# Patient Record
Sex: MALE | Race: WHITE | URBAN - METROPOLITAN AREA
[De-identification: names, ages, dates, MRNs, and addresses within clinical notes are randomized per-mention and may not be internally consistent; named-entity substitution may affect disease eponyms.]

---

## 2019-06-16 ENCOUNTER — ED HISTORICAL/CONVERTED ENCOUNTER (OUTPATIENT)
Dept: OTHER | Age: 59
End: 2019-06-16

## 2019-06-21 ENCOUNTER — ED HISTORICAL/CONVERTED ENCOUNTER (OUTPATIENT)
Dept: OTHER | Age: 59
End: 2019-06-21

## 2023-02-19 ENCOUNTER — APPOINTMENT (OUTPATIENT)
Dept: GENERAL RADIOLOGY | Age: 63
End: 2023-02-19
Attending: EMERGENCY MEDICINE
Payer: COMMERCIAL

## 2023-02-19 ENCOUNTER — HOSPITAL ENCOUNTER (EMERGENCY)
Age: 63
Discharge: HOME | End: 2023-02-19
Attending: EMERGENCY MEDICINE
Payer: COMMERCIAL

## 2023-02-19 VITALS
WEIGHT: 184 LBS | BODY MASS INDEX: 29.57 KG/M2 | HEART RATE: 93 BPM | HEIGHT: 66 IN | OXYGEN SATURATION: 98 % | RESPIRATION RATE: 19 BRPM | SYSTOLIC BLOOD PRESSURE: 141 MMHG | TEMPERATURE: 98.7 F | DIASTOLIC BLOOD PRESSURE: 70 MMHG

## 2023-02-19 DIAGNOSIS — S92.002A CLOSED DISPLACED FRACTURE OF LEFT CALCANEUS, UNSPECIFIED PORTION OF CALCANEUS, INITIAL ENCOUNTER: Primary | ICD-10-CM

## 2023-02-19 PROCEDURE — 99283 EMERGENCY DEPT VISIT LOW MDM: CPT

## 2023-02-19 PROCEDURE — 74011250637 HC RX REV CODE- 250/637

## 2023-02-19 PROCEDURE — 73610 X-RAY EXAM OF ANKLE: CPT

## 2023-02-19 PROCEDURE — 73562 X-RAY EXAM OF KNEE 3: CPT

## 2023-02-19 PROCEDURE — 73630 X-RAY EXAM OF FOOT: CPT

## 2023-02-19 PROCEDURE — 75810000053 HC SPLINT APPLICATION

## 2023-02-19 RX ORDER — IBUPROFEN 600 MG/1
600 TABLET ORAL
Qty: 30 TABLET | Refills: 0 | Status: SHIPPED | OUTPATIENT
Start: 2023-02-19

## 2023-02-19 RX ORDER — ACETAMINOPHEN 325 MG/1
650 TABLET ORAL
Qty: 30 TABLET | Refills: 0 | Status: SHIPPED | OUTPATIENT
Start: 2023-02-19

## 2023-02-19 RX ORDER — ACETAMINOPHEN 500 MG
1000 TABLET ORAL
Status: COMPLETED | OUTPATIENT
Start: 2023-02-19 | End: 2023-02-19

## 2023-02-19 RX ORDER — ROSUVASTATIN CALCIUM 5 MG/1
5 TABLET, COATED ORAL DAILY
COMMUNITY
Start: 2023-02-14

## 2023-02-19 RX ORDER — FINASTERIDE 5 MG/1
5 TABLET, FILM COATED ORAL DAILY
COMMUNITY
Start: 2023-02-14

## 2023-02-19 RX ORDER — HYDROCODONE BITARTRATE AND ACETAMINOPHEN 5; 325 MG/1; MG/1
1 TABLET ORAL
Qty: 10 TABLET | Refills: 0 | Status: SHIPPED | OUTPATIENT
Start: 2023-02-19 | End: 2023-02-22

## 2023-02-19 RX ORDER — IBUPROFEN 600 MG/1
600 TABLET ORAL
Status: COMPLETED | OUTPATIENT
Start: 2023-02-19 | End: 2023-02-19

## 2023-02-19 RX ADMIN — IBUPROFEN 600 MG: 600 TABLET, FILM COATED ORAL at 19:32

## 2023-02-19 RX ADMIN — ACETAMINOPHEN 1000 MG: 500 TABLET ORAL at 19:32

## 2023-02-19 NOTE — ED TRIAGE NOTES
Pt reports fall off 10 ft ladder about 2 hours ago. Bruising, deformity, and swelling noted to left ankle and foot. Pt reports pain to left knee. Pt denies hitting his head, No LOC.  Pt is not on blood thinners

## 2023-02-20 ENCOUNTER — OFFICE VISIT (OUTPATIENT)
Dept: PODIATRY | Age: 63
End: 2023-02-20
Payer: COMMERCIAL

## 2023-02-20 VITALS
HEIGHT: 66 IN | HEART RATE: 88 BPM | BODY MASS INDEX: 29.57 KG/M2 | WEIGHT: 184 LBS | TEMPERATURE: 97.8 F | DIASTOLIC BLOOD PRESSURE: 74 MMHG | SYSTOLIC BLOOD PRESSURE: 135 MMHG

## 2023-02-20 DIAGNOSIS — S92.002A CLOSED NONDISPLACED FRACTURE OF LEFT CALCANEUS, UNSPECIFIED PORTION OF CALCANEUS, INITIAL ENCOUNTER: Primary | ICD-10-CM

## 2023-02-20 DIAGNOSIS — S92.015A CLOSED NONDISPLACED FRACTURE OF BODY OF LEFT CALCANEUS, INITIAL ENCOUNTER: Primary | ICD-10-CM

## 2023-02-20 PROCEDURE — 99203 OFFICE O/P NEW LOW 30 MIN: CPT | Performed by: PODIATRIST

## 2023-02-20 NOTE — ED PROVIDER NOTES
[facility] EMERGENCY DEPARTMENT  EMERGENCY DEPARTMENT HISTORY AND PHYSICAL EXAM      Date: 2/19/2023  Patient Name: Ann-Marie Morales  MRN: [id number]  [place]: 1960  Date of evaluation: 2/19/2023  Provider: Ilene Cleveland NP   Note Started: 8:47 PM 2/19/23    HISTORY OF PRESENT ILLNESS     Chief Complaint   Patient presents with    Ankle Injury    Knee Injury    Fall       History Provided By: Patient, Patient's Wife, and Patient's Daughter    HPI: Ann-Marie Smoker, 57 y.o. male who reports only past medical history is hyperlipidemia, presents to the emergency department accompanied by his wife and daughter with complaints of foot/ankle pain s/p mechanical fall from a ladder just prior to arrival.  Patient reports falling about 10 feet and landed on the bottom of his left foot. Denies head trauma/LOC, neck pain, back pain, right lower extremity pain, hip pain. No pain medications taken prior to arrival.  Upon arrival to the emergency department patient is alert, interacting appropriately, no obvious distress noted. PAST MEDICAL HISTORY   Past Medical History:  No past medical history on file. Past Surgical History:  No past surgical history on file. Family History:  No family history on file. Social History: Allergies:  No Known Allergies    PCP: None    Current Meds:   Discharge Medication List as of 2/19/2023  8:15 PM        CONTINUE these medications which have NOT CHANGED    Details   finasteride (PROSCAR) 5 mg tablet Take 5 mg by mouth daily. , Historical Med      rosuvastatin (CRESTOR) 5 mg tablet Take 5 mg by mouth daily. , Historical Med             REVIEW OF SYSTEMS   Review of Systems   Musculoskeletal:  Negative for back pain, neck pain and neck stiffness. Complaints of left ankle and foot pain, denies any other pain/injuries   Skin:  Negative for wound. Neurological:  Negative for dizziness, weakness, light-headedness, numbness and headaches.         She denies head strike/LOC Positives and Pertinent negatives as per HPI. PHYSICAL EXAM     ED Triage Vitals [02/19/23 1842]   ED Encounter Vitals Group      BP (!) 141/70      Pulse (Heart Rate) 93      Resp Rate 19      Temp 98.7 °F (37.1 °C)      Temp src       O2 Sat (%) 98 %      Weight 184 lb      Height 5' 6\"      Physical Exam  Constitutional:       General: He is not in acute distress. Appearance: Normal appearance. He is normal weight. He is not ill-appearing, toxic-appearing or diaphoretic. HENT:      Head: Normocephalic and atraumatic. Eyes:      Extraocular Movements: Extraocular movements intact. Conjunctiva/sclera: Conjunctivae normal.      Pupils: Pupils are equal, round, and reactive to light. Cardiovascular:      Rate and Rhythm: Normal rate and regular rhythm. Pulses: Normal pulses. Heart sounds: Normal heart sounds. Pulmonary:      Effort: Pulmonary effort is normal.      Breath sounds: Normal breath sounds. Musculoskeletal:         General: Swelling, tenderness, deformity and signs of injury present. Cervical back: Normal range of motion and neck supple. No rigidity or tenderness. Comments: Pain and swelling to left lateral malleolus and calcaneus; neurovascularly intact. No c-spine tenderness, back pain, or vertebral body tenderness on exam.  No other injuries identified. Lymphadenopathy:      Cervical: No cervical adenopathy. Skin:     General: Skin is warm and dry. Capillary Refill: Capillary refill takes less than 2 seconds. Findings: No bruising or erythema. Neurological:      Mental Status: He is alert and oriented to person, place, and time. Psychiatric:         Mood and Affect: Mood normal.         Behavior: Behavior normal.         Thought Content:  Thought content normal.         Judgment: Judgment normal.       SCREENINGS               No data recorded      LAB, EKG AND DIAGNOSTIC RESULTS     Radiologic Studies:  Non-plain film images such as CT, Ultrasound and MRI are read by the radiologist. Plain radiographic images are visualized and preliminarily interpreted by the ED Provider with the below findings:        Interpretation per the Radiologist below, if available at the time of this note:  XR ANKLE LT MIN 3 V    Result Date: 2/19/2023  Left knee, ankle, foot. 2/19/2023 7:00 PM INDICATION: injury. Fall from ladder. COMPARISON: None. FINDINGS: 3 views of the left knee show no fracture, dislocation, soft tissue swelling, joint effusion. Please note: No dedicated radiographs of the left tibia and fibula were obtained. The diaphyses are appropriately excluded from the joint radiographs. 3 views of the left ankle and 3 views of the left foot show a comminuted calcaneal fracture, with lateral soft tissue swelling predominantly at the level of the ankle. Comminuted calcaneal fracture. XR FOOT LT MIN 3 V    Result Date: 2/19/2023  Left knee, ankle, foot. 2/19/2023 7:00 PM INDICATION: injury. Fall from ladder. COMPARISON: None. FINDINGS: 3 views of the left knee show no fracture, dislocation, soft tissue swelling, joint effusion. Please note: No dedicated radiographs of the left tibia and fibula were obtained. The diaphyses are appropriately excluded from the joint radiographs. 3 views of the left ankle and 3 views of the left foot show a comminuted calcaneal fracture, with lateral soft tissue swelling predominantly at the level of the ankle. Comminuted calcaneal fracture. XR KNEE LT 3 V    Result Date: 2/19/2023  Left knee, ankle, foot. 2/19/2023 7:00 PM INDICATION: injury. Fall from ladder. COMPARISON: None. FINDINGS: 3 views of the left knee show no fracture, dislocation, soft tissue swelling, joint effusion. Please note: No dedicated radiographs of the left tibia and fibula were obtained. The diaphyses are appropriately excluded from the joint radiographs.  3 views of the left ankle and 3 views of the left foot show a comminuted calcaneal fracture, with lateral soft tissue swelling predominantly at the level of the ankle. Comminuted calcaneal fracture. PROCEDURES   Unless otherwise noted below, none. Performed by: Kiara Mariee NP   Procedures      CRITICAL CARE TIME       ED COURSE and DIFFERENTIAL DIAGNOSIS/MDM   Vitals:    Vitals:    02/19/23 1842   BP: (!) 141/70   Pulse: 93   Resp: 19   Temp: 98.7 °F (37.1 °C)   SpO2: 98%   Weight: 83.5 kg (184 lb)   Height: 5' 6\" (1.676 m)        Patient was given the following medications:  Medications   acetaminophen (TYLENOL) tablet 1,000 mg (1,000 mg Oral Given 2/19/23 1932)   ibuprofen (MOTRIN) tablet 600 mg (600 mg Oral Given 2/19/23 1932)         CONSULTS: (Who and What was discussed)  None     Chronic Conditions: HLD  Social Determinants affecting Dx or Tx: None    Records Reviewed (source and summary of external notes): Nursing notes    CC/HPI Summary, DDx, ED Course, and Reassessment. Disposition Considerations (Tests not done, Shared Decision Making, Pt Expectation of Test or Treatment.):     Presents to the emergency department accompanied by his wife and daughter with complaints of foot/ankle pain s/p mechanical fall from a ladder just prior to arrival.  Patient reports falling about 10 feet and landed on the bottom of his left foot. Denies head trauma/LOC, neck pain, back pain, right lower extremity pain, hip pain. No signs of head trauma, C-spine tenderness, midline back tenderness or any other pain noted on exam, other than his left ankle/foot. X-rays of left foot, ankle, knee ordered in triage. Will administer pain medication. X-rays positive for left calcaneus fracture, discussed results with patient and his family; all questions answered.   Will place short leg posterior splint, provided crutches and advised no weightbearing on his left lower extremity, will also provide contact information for Ortho/podiatry follow-up with Dr. Lidya Vinson for further evaluation and management. Patient has been advised to call Ortho tomorrow to be seen sometime this week. Return precautions provided. Primary language is [place] video  number [id number] used for this patient encounter. FINAL IMPRESSION     1. Closed displaced fracture of left calcaneus, unspecified portion of calcaneus, initial encounter          DISPOSITION/PLAN   Discharged    Discharge Note: The patient is stable for discharge home. The signs, symptoms, diagnosis, and discharge instructions have been discussed, understanding conveyed, and agreed upon. The patient is to follow up as recommended or return to ER should their symptoms worsen. PATIENT REFERRED TO:  Follow-up Information       Follow up With Specialties Details Why Contact Info    Huong Goodwin DPM Podiatry Schedule an appointment as soon as possible for a visit in 2 days Left calcaneous fracture (heel fracture) [address] [address]  [phone]                DISCHARGE MEDICATIONS:  Discharge Medication List as of 2/19/2023  8:15 PM        START taking these medications    Details   HYDROcodone-acetaminophen (Norco) 5-325 mg per tablet Take 1 Tablet by mouth every six (6) hours as needed for Pain for up to 3 days. Max Daily Amount: 4 Tablets., Normal, Disp-10 Tablet, R-0      acetaminophen (TYLENOL) 325 mg tablet Take 2 Tablets by mouth every six (6) hours as needed for Pain., Normal, Disp-30 Tablet, R-0      ibuprofen (MOTRIN) 600 mg tablet Take 1 Tablet by mouth every six (6) hours as needed for Pain., Normal, Disp-30 Tablet, R-0           CONTINUE these medications which have NOT CHANGED    Details   finasteride (PROSCAR) 5 mg tablet Take 5 mg by mouth daily. , Historical Med      rosuvastatin (CRESTOR) 5 mg tablet Take 5 mg by mouth daily. , Historical Med               DISCONTINUED MEDICATIONS:  Discharge Medication List as of 2/19/2023  8:15 PM          I am the Primary Clinician of Record: Yony Hanna NP (electronically signed)    (Please note that parts of this dictation were completed with voice recognition software. Quite often unanticipated grammatical, syntax, homophones, and other interpretive errors are inadvertently transcribed by the computer software. Please disregards these errors.  Please excuse any errors that have escaped final proofreading.)

## 2023-02-20 NOTE — DISCHARGE INSTRUCTIONS
Please return to the emergency department if you experience increased leg pain, coolness/discoloration to your leg, neck pain, back pain, or for any other symptoms that are concerning to you. Thank you! Thank you for allowing me to care for you in the emergency department. It is my goal to provide you with excellent care. If you have not received excellent quality care, please ask to speak to the nurse manager. Please fill out the survey that will come to you by mail or email since we listen to your feedback! Below you will find a list of your tests from today's visit. Should you have any questions, please do not hesitate to call the emergency department. Labs  No results found for this or any previous visit (from the past 12 hour(s)). Radiologic Studies  XR FOOT LT MIN 3 V   Final Result   Comminuted calcaneal fracture. XR ANKLE LT MIN 3 V   Final Result   Comminuted calcaneal fracture. XR KNEE LT 3 V   Final Result   Comminuted calcaneal fracture. CT Results  (Last 48 hours)      None          CXR Results  (Last 48 hours)      None          ------------------------------------------------------------------------------------------------------------  The exam and treatment you received in the Emergency Department were for an urgent problem and are not intended as complete care. It is important that you follow-up with a doctor, nurse practitioner, or physician assistant to:  (1) confirm your diagnosis,  (2) re-evaluation of changes in your illness and treatment, and  (3) for ongoing care. Please take your discharge instructions with you when you go to your follow-up appointment. If you have any problem arranging a follow-up appointment, contact the Emergency Department. If your symptoms become worse or you do not improve as expected and you are unable to reach your health care provider, please return to the Emergency Department. We are available 24 hours a day.      If a prescription has been provided, please have it filled as soon as possible to prevent a delay in treatment. If you have any questions or reservations about taking the medication due to side effects or interactions with other medications, please call your primary care provider or contact the ER.

## 2023-02-20 NOTE — PROGRESS NOTES
1. Have you been to the ER, urgent care clinic since your last visit? Hospitalized since your last visit? Yes Where: [facility]    2. Have you seen or consulted any other health care providers outside of the [address] [place] [address] since your last visit? Include any pap smears or colon screening.  No    Chief Complaint   Patient presents with    Hospital Follow Up     ER follow up left ankle injury

## 2023-02-20 NOTE — PROGRESS NOTES
[place] [facility]     Patient Name: Lázaro Moreau    : 1960    Visit Date: 2023    Office Visit Note    Subjective:     Patient is a 57 y.o. male who is being seen in office initial visit for pain to the left foot. Patient states he fell from a ladder yesterday. Patient went to emergency department and they placed a posterior splint. History reviewed. No pertinent past medical history. History reviewed. No pertinent surgical history. History reviewed. No pertinent family history. Social History     Tobacco Use    Smoking status: Not on file    Smokeless tobacco: Not on file   Substance Use Topics    Alcohol use: Not on file     No Known Allergies  Prior to Admission medications    Medication Sig Start Date End Date Taking? Authorizing Provider   finasteride (PROSCAR) 5 mg tablet Take 5 mg by mouth daily. 23   Other, MD Idania   rosuvastatin (CRESTOR) 5 mg tablet Take 5 mg by mouth daily. 23   Idania Norman MD   HYDROcodone-acetaminophen (Norco) 5-325 mg per tablet Take 1 Tablet by mouth every six (6) hours as needed for Pain for up to 3 days. Max Daily Amount: 4 Tablets. 23  Ronal Melendrez NP   acetaminophen (TYLENOL) 325 mg tablet Take 2 Tablets by mouth every six (6) hours as needed for Pain. 23   Ronal Melendrez NP   ibuprofen (MOTRIN) 600 mg tablet Take 1 Tablet by mouth every six (6) hours as needed for Pain. 23   Ronal Melendrez NP       Review of Systems   Constitutional:  Negative for chills, diaphoresis, fever and malaise/fatigue. Respiratory:  Negative for cough, hemoptysis, sputum production, shortness of breath and wheezing. No cyanosis   Cardiovascular:  Negative for chest pain, palpitations, claudication and leg swelling. Gastrointestinal:  Negative for abdominal pain, constipation, diarrhea, heartburn, nausea and vomiting. Genitourinary:  Negative for frequency. Musculoskeletal:  Positive for joint pain.  Negative for back pain, myalgias and neck pain. Skin:  Negative for itching and rash. Neurological:  Negative for tingling and headaches. Alert and oriented x 4. Endo/Heme/Allergies:  Negative for polydipsia. Psychiatric/Behavioral:  Negative for depression and memory loss. The patient is not nervous/anxious. Objective:     Visit Vitals  /74 (BP 1 Location: Left upper arm, BP Patient Position: Sitting, BP Cuff Size: Adult)   Pulse 88   Temp 97.8 °F (36.6 °C) (Temporal)   Ht 5' 6\" (1.676 m)   Wt 184 lb (83.5 kg)   BMI 29.70 kg/m²       GEN: Pt is AAOx4 and in NAD. No dressing noted to B/L LE. No family noted at [facility]  DERM: No wounds noted to B/L LE. No dry skin noted to B/L LE. No proximal lymphatic streaking noted to B/L LE. NCSOI noted to B/L LE  VASC: Pedal pulses (DP/PT) palpable to B/L LE. CFT<3sec to all digits of B/L LE. Pedal hair growth noted to the level of the digits for B/L LE. Skin temp is warm to warm from proximal to distal for B/L LE. Neg homans/susy signs to B/L LE. No varicosities or telangectasias noted to B/L LE.  NEURO: Protective and epicritic sensations grossly intact to B/L LE  MSK: Pain on palpation to the left heel. No gross deformities. Good muscle tone and bulk noted to B/L LE.  PSYCH: Cooperative with normal mood and affect     Data Review: No results found for this or any previous visit (from the past 24 hour(s)). Assessment:   Diagnoses and all orders for this visit:    1. Closed nondisplaced fracture of body of left calcaneus, initial encounter       Plan:     Patient seen and evaluated in the office. X-rays were seen and discussed with patient. Discussed with patient that he will benefit from a CT scan to better evaluate calcaneal fracture. At this time patient does not want to do CT scan due to copayment. Discussed conservative versus surgical treatment of this condition. Since fractures are minimally displaced recommend nonsurgical treatment.   Patient to be in a posterior splint nonweightbearing.              Tom Pepper DPM, CWSP, [address] , [facility]: [phone]  F: [phone]

## 2023-02-27 ENCOUNTER — OFFICE VISIT (OUTPATIENT)
Dept: PODIATRY | Age: 63
End: 2023-02-27
Payer: COMMERCIAL

## 2023-02-27 VITALS — HEIGHT: 66 IN | BODY MASS INDEX: 29.57 KG/M2 | TEMPERATURE: 97.8 F | HEART RATE: 86 BPM | WEIGHT: 184 LBS

## 2023-02-27 DIAGNOSIS — S92.015A CLOSED NONDISPLACED FRACTURE OF BODY OF LEFT CALCANEUS, INITIAL ENCOUNTER: Primary | ICD-10-CM

## 2023-02-27 RX ORDER — DEXTROMETHORPHAN HYDROBROMIDE, GUAIFENESIN 5; 100 MG/5ML; MG/5ML
650 LIQUID ORAL EVERY 8 HOURS
Qty: 30 TABLET | Refills: 0 | Status: SHIPPED | OUTPATIENT
Start: 2023-02-27

## 2023-02-27 RX ORDER — IBUPROFEN 800 MG/1
800 TABLET ORAL
Qty: 30 TABLET | Refills: 0 | Status: SHIPPED | OUTPATIENT
Start: 2023-02-27

## 2023-02-27 NOTE — PROGRESS NOTES
1. Have you been to the ER, urgent care clinic since your last visit? Hospitalized since your last visit? No    2. Have you seen or consulted any other health care providers outside of the [address] [place] [address] since your last visit? Include any pap smears or colon screening.  No    Chief Complaint   Patient presents with    Foot Injury     Left foot fracture/patient states he is having a lot of pain

## 2023-02-27 NOTE — PROGRESS NOTES
[place] [facility]     Patient Name: Everette Jarrett    : 1960    Visit Date: 2023    Office Visit Note    Subjective:         Patient is a 57 y.o. male who is being seen in office follow-up visit for pain to the left foot. Patient presents today as a emergency appointment due to increase of pain to the left lower extremity. Patient states he has been nonweightbearing to the left lower extremity. Patient has been taking the pain medication prescribed in the emergency department. Patient states he is unable to do CT scan because of cost.    History reviewed. No pertinent past medical history. History reviewed. No pertinent surgical history. History reviewed. No pertinent family history. Social History     Tobacco Use    Smoking status: Not on file    Smokeless tobacco: Not on file   Substance Use Topics    Alcohol use: Not on file     No Known Allergies  Prior to Admission medications    Medication Sig Start Date End Date Taking? Authorizing Provider   ibuprofen (MOTRIN) 800 mg tablet Take 1 Tablet by mouth every eight (8) hours as needed for Pain. 23  Yes Garnett Runner, DPM   acetaminophen (Tylenol 8 Hour) 650 mg TbER Take 1 Tablet by mouth every eight (8) hours. 23  Yes Garnett Runner, DPM   finasteride (PROSCAR) 5 mg tablet Take 5 mg by mouth daily. 23   Idania Norman MD   rosuvastatin (CRESTOR) 5 mg tablet Take 5 mg by mouth daily. 23   Idania Norman MD   acetaminophen (TYLENOL) 325 mg tablet Take 2 Tablets by mouth every six (6) hours as needed for Pain. 23   Lary Rubin NP   ibuprofen (MOTRIN) 600 mg tablet Take 1 Tablet by mouth every six (6) hours as needed for Pain. 23   Lary Rubin NP       Review of Systems   Constitutional:  Negative for chills, diaphoresis, fever and malaise/fatigue. Respiratory:  Negative for cough, hemoptysis, sputum production, shortness of breath and wheezing.          No cyanosis   Cardiovascular:  Negative for chest pain, palpitations, claudication and leg swelling. Gastrointestinal:  Negative for abdominal pain, constipation, diarrhea, heartburn, nausea and vomiting. Genitourinary:  Negative for frequency. Musculoskeletal:  Positive for joint pain. Negative for back pain, myalgias and neck pain. Skin:  Negative for itching and rash. Neurological:  Negative for tingling and headaches. Alert and oriented x 4. Endo/Heme/Allergies:  Negative for polydipsia. Psychiatric/Behavioral:  Negative for depression and memory loss. The patient is not nervous/anxious. Objective:     Visit Vitals  Pulse 86   Temp 97.8 °F (36.6 °C) (Oral)   Ht 5' 6\" (1.676 m)   Wt 184 lb (83.5 kg)   BMI 29.70 kg/m²       GEN: Pt is AAOx4 and in NAD. No dressing noted to B/L LE. No family noted at [facility]  DERM: No wounds noted to B/L LE. No dry skin noted to B/L LE. No proximal lymphatic streaking noted to B/L LE. NCSOI noted to B/L LE  VASC: Pedal pulses (DP/PT) palpable to B/L LE. CFT<3sec to all digits of B/L LE. Pedal hair growth noted to the level of the digits for B/L LE. Skin temp is warm to warm from proximal to distal for B/L LE. Neg homans/susy signs to B/L LE. No varicosities or telangectasias noted to B/L LE.  NEURO: Protective and epicritic sensations grossly intact to B/L LE  MSK: Pain on palpation to the left heel. No gross deformities. Good muscle tone and bulk noted to B/L LE. Ecchymosis noted to the left lower extremity. Edema noted to left lower extremity  PSYCH: Cooperative with normal mood and affect     Data Review: No results found for this or any previous visit (from the past 24 hour(s)). Assessment:   Diagnoses and all orders for this visit:    1. Closed nondisplaced fracture of body of left calcaneus, initial encounter    Other orders  -     ibuprofen (MOTRIN) 800 mg tablet; Take 1 Tablet by mouth every eight (8) hours as needed for Pain. -     acetaminophen (Tylenol 8 Hour) 650 mg TbER;  Take 1 Tablet by mouth every eight (8) hours. Plan:     Patient seen and evaluated in the office. Discussed with patient to limit walking and standing. Posterior splint was removed. Leg was evaluated with a lot of swelling and ecchymosis. No fracture blisters noted. A posterior splint with Kc compression dressing was reapplied to the leg. Patient to remain nonweightbearing to left lower extremity with crutches. Adequate pain medication was prescribed to patient. Patient to follow-up in 4 weeks for x-rays     Follow-up and Dispositions    Return in about 4 weeks (around 3/27/2023).            Kika Kimbrough DPM, CWSP, [address], [address] [place] [address]  Mariana Tate: [phone]  F: [phone]

## 2023-03-20 ENCOUNTER — OFFICE VISIT (OUTPATIENT)
Dept: PODIATRY | Age: 63
End: 2023-03-20

## 2023-03-20 VITALS
BODY MASS INDEX: 26.42 KG/M2 | SYSTOLIC BLOOD PRESSURE: 164 MMHG | WEIGHT: 164.4 LBS | HEIGHT: 66 IN | TEMPERATURE: 98.1 F | DIASTOLIC BLOOD PRESSURE: 88 MMHG | HEART RATE: 80 BPM

## 2023-03-20 DIAGNOSIS — S92.015A CLOSED NONDISPLACED FRACTURE OF BODY OF LEFT CALCANEUS, INITIAL ENCOUNTER: Primary | ICD-10-CM

## 2023-03-20 RX ORDER — ACETAMINOPHEN 325 MG/1
650 TABLET ORAL
Qty: 30 TABLET | Refills: 0 | Status: SHIPPED | OUTPATIENT
Start: 2023-03-20

## 2023-03-20 RX ORDER — IBUPROFEN 800 MG/1
800 TABLET ORAL
Qty: 30 TABLET | Refills: 0 | Status: SHIPPED | OUTPATIENT
Start: 2023-03-20 | End: 2023-03-20 | Stop reason: SDUPTHER

## 2023-03-20 RX ORDER — DEXTROMETHORPHAN HYDROBROMIDE, GUAIFENESIN 5; 100 MG/5ML; MG/5ML
650 LIQUID ORAL EVERY 8 HOURS
Qty: 30 TABLET | Refills: 0 | Status: SHIPPED | OUTPATIENT
Start: 2023-03-20

## 2023-03-20 RX ORDER — IBUPROFEN 800 MG/1
800 TABLET ORAL
Qty: 30 TABLET | Refills: 0 | Status: SHIPPED | OUTPATIENT
Start: 2023-03-20

## 2023-04-20 ENCOUNTER — OFFICE VISIT (OUTPATIENT)
Dept: PODIATRY | Age: 63
End: 2023-04-20
Payer: COMMERCIAL

## 2023-04-20 DIAGNOSIS — S92.001D CLOSED NONDISPLACED FRACTURE OF RIGHT CALCANEUS WITH ROUTINE HEALING, UNSPECIFIED PORTION OF CALCANEUS, SUBSEQUENT ENCOUNTER: Primary | ICD-10-CM

## 2023-04-20 PROCEDURE — 99213 OFFICE O/P EST LOW 20 MIN: CPT | Performed by: PODIATRIST

## 2023-04-20 RX ORDER — DICLOFENAC SODIUM 75 MG/1
75 TABLET, DELAYED RELEASE ORAL 2 TIMES DAILY
Qty: 30 TABLET | Refills: 0 | Status: SHIPPED | OUTPATIENT
Start: 2023-04-20

## 2023-05-24 ENCOUNTER — OFFICE VISIT (OUTPATIENT)
Age: 63
End: 2023-05-24

## 2023-05-24 VITALS
HEIGHT: 66 IN | BODY MASS INDEX: 26.36 KG/M2 | TEMPERATURE: 98 F | WEIGHT: 164 LBS | SYSTOLIC BLOOD PRESSURE: 129 MMHG | DIASTOLIC BLOOD PRESSURE: 68 MMHG | HEART RATE: 80 BPM

## 2023-05-24 DIAGNOSIS — S92.015S CLOSED NONDISPLACED FRACTURE OF BODY OF LEFT CALCANEUS, SEQUELA: Primary | ICD-10-CM

## 2023-05-24 NOTE — PROGRESS NOTES
1. Have you been to the ER, urgent care clinic since your last visit? Hospitalized since your last visit? No    2. Have you seen or consulted any other health care providers outside of the [address] [place] [address] since your last visit? Include any pap smears or colon screening.  No    Chief Complaint   Patient presents with    Follow-up     Left foot fracture     /68 (Site: Left Upper Arm, Position: Sitting, Cuff Size: Medium Adult)   Pulse 80   Temp 98 °F (36.7 °C) (Temporal)   Ht 5' 6\" (1.676 m)   Wt 164 lb (74.4 kg)   BMI 26.47 kg/m²

## 2023-06-27 ENCOUNTER — OFFICE VISIT (OUTPATIENT)
Age: 63
End: 2023-06-27

## 2023-06-27 VITALS
HEIGHT: 66 IN | DIASTOLIC BLOOD PRESSURE: 75 MMHG | SYSTOLIC BLOOD PRESSURE: 120 MMHG | WEIGHT: 164 LBS | BODY MASS INDEX: 26.36 KG/M2 | HEART RATE: 83 BPM | TEMPERATURE: 97.7 F

## 2023-06-27 DIAGNOSIS — B35.3 TINEA PEDIS OF BOTH FEET: ICD-10-CM

## 2023-06-27 DIAGNOSIS — S92.015S CLOSED NONDISPLACED FRACTURE OF BODY OF LEFT CALCANEUS, SEQUELA: Primary | ICD-10-CM

## 2023-06-27 RX ORDER — CLOTRIMAZOLE AND BETAMETHASONE DIPROPIONATE 10; .64 MG/G; MG/G
CREAM TOPICAL
Qty: 385 G | Refills: 1 | Status: SHIPPED | OUTPATIENT
Start: 2023-06-27

## 2023-07-23 ASSESSMENT — ENCOUNTER SYMPTOMS
ABDOMINAL PAIN: 0
VOMITING: 0
SHORTNESS OF BREATH: 0
DIARRHEA: 0

## 2024-01-17 ENCOUNTER — OFFICE VISIT (OUTPATIENT)
Age: 64
End: 2024-01-17
Payer: MEDICARE

## 2024-01-17 VITALS
WEIGHT: 164 LBS | BODY MASS INDEX: 26.36 KG/M2 | SYSTOLIC BLOOD PRESSURE: 110 MMHG | OXYGEN SATURATION: 98 % | HEART RATE: 78 BPM | HEIGHT: 66 IN | DIASTOLIC BLOOD PRESSURE: 70 MMHG

## 2024-01-17 DIAGNOSIS — S92.015S CLOSED NONDISPLACED FRACTURE OF BODY OF LEFT CALCANEUS, SEQUELA: Primary | ICD-10-CM

## 2024-01-17 PROCEDURE — 99213 OFFICE O/P EST LOW 20 MIN: CPT | Performed by: PODIATRIST

## 2024-01-17 PROCEDURE — 1123F ACP DISCUSS/DSCN MKR DOCD: CPT | Performed by: PODIATRIST

## 2024-01-17 RX ORDER — LOSARTAN POTASSIUM AND HYDROCHLOROTHIAZIDE 12.5; 5 MG/1; MG/1
1 TABLET ORAL DAILY
COMMUNITY
Start: 2023-11-23

## 2024-01-17 RX ORDER — TAMSULOSIN HYDROCHLORIDE 0.4 MG/1
0.4 CAPSULE ORAL DAILY
COMMUNITY
Start: 2023-10-24

## 2024-01-17 RX ORDER — METHYLPREDNISOLONE 4 MG/1
TABLET ORAL
Qty: 1 TABLET | Refills: 0 | Status: SHIPPED | OUTPATIENT
Start: 2024-01-17 | End: 2024-01-23

## 2024-01-17 RX ORDER — DICLOFENAC SODIUM 75 MG/1
75 TABLET, DELAYED RELEASE ORAL 2 TIMES DAILY
COMMUNITY
Start: 2023-04-20

## 2024-01-17 NOTE — PROGRESS NOTES
Chief Complaint   Patient presents with    Follow-up    Foot Pain     /70 (Site: Left Upper Arm, Position: Sitting, Cuff Size: Medium Adult)   Pulse 78   Ht 1.676 m (5' 6\")   Wt 74.4 kg (164 lb)   SpO2 98%   BMI 26.47 kg/m²     1. Have you been to the ER, urgent care clinic since your last visit?  Hospitalized since your last visit?No    2. Have you seen or consulted any other health care providers outside of the [facility] since your last visit?  Include any pap smears or colon screening. No

## 2024-01-24 ASSESSMENT — ENCOUNTER SYMPTOMS
DIARRHEA: 0
VOMITING: 0
SHORTNESS OF BREATH: 0
ABDOMINAL PAIN: 0

## 2024-01-24 NOTE — PROGRESS NOTES
[facility]         Patient Name: Yony Kirby    : 1960    Visit Date: 2024    Office Visit Note      Subjective:         Patient is a 63 y.o. male who is being seen in office follow up visit for    No past medical history on file.  No past surgical history on file.    No family history on file.   Social History     Tobacco Use    Smoking status: Never    Smokeless tobacco: Never   Substance Use Topics    Alcohol use: Never     No Known Allergies  Prior to Admission medications    Medication Sig Start Date End Date Taking? Authorizing Provider   diclofenac (VOLTAREN) 75 MG EC tablet Take 1 tablet by mouth 2 times daily 23   ProviderRadha MD   losartan-hydroCHLOROthiazide (HYZAAR) 50-12.5 MG per tablet Take 1 tablet by mouth daily 23   Radha Sheikh MD   tamsulosin (FLOMAX) 0.4 MG capsule Take 1 capsule by mouth daily 10/24/23   Radha Sheikh MD   clotrimazole-betamethasone (LOTRISONE) 1-0.05 % cream Apply topically 2 times daily. 23   Rodolfo Mcmullen, GAIL   acetaminophen (TYLENOL) 650 MG extended release tablet Take 1 tablet by mouth in the morning and 1 tablet at noon and 1 tablet in the evening. 3/20/23   Automatic Reconciliation, Ar   acetaminophen (TYLENOL) 325 MG tablet Take 2 tablets by mouth every 6 hours as needed 3/20/23   Automatic Reconciliation, Ar   finasteride (PROSCAR) 5 MG tablet Take 1 tablet by mouth daily 23   Automatic Reconciliation, Ar   ibuprofen (ADVIL;MOTRIN) 600 MG tablet Take 1 tablet by mouth every 6 hours as needed 23   Automatic Reconciliation, Ar   ibuprofen (ADVIL;MOTRIN) 800 MG tablet Take 1 tablet by mouth every 8 hours as needed 3/20/23   Automatic Reconciliation, Ar   rosuvastatin (CRESTOR) 5 MG tablet Take 1 tablet by mouth daily 23   Automatic Reconciliation, Ar       Review of Systems   Constitutional:  Negative for activity change, appetite change, chills, fatigue and fever.   HENT:

## 2024-01-31 ENCOUNTER — OFFICE VISIT (OUTPATIENT)
Age: 64
End: 2024-01-31
Payer: MEDICARE

## 2024-01-31 VITALS
WEIGHT: 164 LBS | TEMPERATURE: 98.4 F | OXYGEN SATURATION: 98 % | DIASTOLIC BLOOD PRESSURE: 58 MMHG | HEART RATE: 68 BPM | BODY MASS INDEX: 26.36 KG/M2 | HEIGHT: 66 IN | SYSTOLIC BLOOD PRESSURE: 110 MMHG

## 2024-01-31 DIAGNOSIS — S92.015S CLOSED NONDISPLACED FRACTURE OF BODY OF LEFT CALCANEUS, SEQUELA: Primary | ICD-10-CM

## 2024-01-31 PROCEDURE — 99213 OFFICE O/P EST LOW 20 MIN: CPT | Performed by: PODIATRIST

## 2024-01-31 PROCEDURE — 1123F ACP DISCUSS/DSCN MKR DOCD: CPT | Performed by: PODIATRIST

## 2024-01-31 RX ORDER — OXYBUTYNIN CHLORIDE 10 MG/1
10 TABLET, EXTENDED RELEASE ORAL DAILY
COMMUNITY
Start: 2024-01-30

## 2024-02-06 ASSESSMENT — ENCOUNTER SYMPTOMS
ABDOMINAL PAIN: 0
SHORTNESS OF BREATH: 0
DIARRHEA: 0
VOMITING: 0

## 2024-02-06 NOTE — PROGRESS NOTES
Chief Complaint   Patient presents with    Foot Injury    Follow-up     BP (!) 110/58 (Site: Left Upper Arm, Position: Sitting, Cuff Size: Medium Adult)   Pulse 68   Temp 98.4 °F (36.9 °C) (Temporal)   Ht 1.676 m (5' 6\")   Wt 74.4 kg (164 lb)   SpO2 98%   BMI 26.47 kg/m²     1. Have you been to the ER, urgent care clinic since your last visit?  Hospitalized since your last visit?No    2. Have you seen or consulted any other health care providers outside of the [facility] since your last visit?  Include any pap smears or colon screening. No    
every 8 hours as needed 3/20/23  Yes Automatic Reconciliation, Ar   rosuvastatin (CRESTOR) 5 MG tablet Take 1 tablet by mouth daily 2/14/23  Yes Automatic Reconciliation, Ar       Review of Systems   Constitutional:  Negative for activity change, appetite change, chills, fatigue and fever.   HENT:  Negative for ear pain.    Respiratory:  Negative for shortness of breath.    Cardiovascular:  Negative for leg swelling.   Gastrointestinal:  Negative for abdominal pain, diarrhea and vomiting.   Musculoskeletal:  Positive for arthralgias.   Neurological:  Negative for weakness.   Psychiatric/Behavioral:  Negative for behavioral problems. The patient is not nervous/anxious.           Objective:     Vitals:    01/31/24 1005   BP: (!) 110/58   Pulse: 68   Temp: 98.4 °F (36.9 °C)   SpO2: 98%       GEN: Pt is AAOx4 and in NAD. No dressing noted to B/L LE. No family noted at BS  DERM: No wounds noted to B/L LE. No dry skin noted to B/L LE. No proximal lymphatic streaking noted to B/L LE. NCSOI noted to B/L LE  VASC: Pedal pulses (DP/PT) palpable to B/L LE. CFT<3sec to all digits of B/L LE. Pedal hair growth noted to the level of the digits for B/L LE. Skin temp is warm to warm from proximal to distal for B/L LE. Neg homans/maribeth signs to B/L LE. No varicosities or telangectasias noted to B/L LE.  NEURO: Protective and epicritic sensations grossly intact to B/L LE  MSK: Pain on palpation on motion to the STJ joint left foot.  No gross deformities. Good muscle tone and bulk noted      Assessment:      Diagnosis Orders   1. Closed nondisplaced fracture of body of left calcaneus, sequela             Plan:     Patient seen and evaluated in the office.  Discussed with patient about wearing ankle brace if pain continues to occur.  Will defer on corticosteroid injection and CT scan due to pain improving.  Also discussed with patient that if pain persist then I recommend a subtalar joint arthrodesis.  Patient to take anti-inflammatory